# Patient Record
Sex: FEMALE | Race: WHITE | Employment: OTHER | URBAN - NONMETROPOLITAN AREA
[De-identification: names, ages, dates, MRNs, and addresses within clinical notes are randomized per-mention and may not be internally consistent; named-entity substitution may affect disease eponyms.]

---

## 2021-05-28 ENCOUNTER — APPOINTMENT (OUTPATIENT)
Dept: GENERAL RADIOLOGY | Age: 86
End: 2021-05-28
Payer: MEDICARE

## 2021-05-28 ENCOUNTER — APPOINTMENT (OUTPATIENT)
Dept: CT IMAGING | Age: 86
End: 2021-05-28
Payer: MEDICARE

## 2021-05-28 ENCOUNTER — HOSPITAL ENCOUNTER (OUTPATIENT)
Age: 86
Setting detail: OBSERVATION
Discharge: NURSING, REHAB OR LONG-TERM CARE | End: 2021-05-29
Attending: EMERGENCY MEDICINE | Admitting: INTERNAL MEDICINE
Payer: MEDICARE

## 2021-05-28 DIAGNOSIS — R26.9 GAIT DISTURBANCE: ICD-10-CM

## 2021-05-28 DIAGNOSIS — N39.0 URINARY TRACT INFECTION WITHOUT HEMATURIA, SITE UNSPECIFIED: ICD-10-CM

## 2021-05-28 DIAGNOSIS — R53.1 GENERAL WEAKNESS: ICD-10-CM

## 2021-05-28 DIAGNOSIS — R07.9 CHEST PAIN, UNSPECIFIED TYPE: Primary | ICD-10-CM

## 2021-05-28 LAB
ALBUMIN SERPL-MCNC: 3.9 G/DL (ref 3.5–5.2)
ALP BLD-CCNC: 115 U/L (ref 35–104)
ALT SERPL-CCNC: 19 U/L (ref 5–33)
ANION GAP SERPL CALCULATED.3IONS-SCNC: 10 MMOL/L (ref 7–19)
AST SERPL-CCNC: 24 U/L (ref 5–32)
BACTERIA: ABNORMAL /HPF
BASOPHILS ABSOLUTE: 0.1 K/UL (ref 0–0.2)
BASOPHILS RELATIVE PERCENT: 1 % (ref 0–1)
BILIRUB SERPL-MCNC: 0.3 MG/DL (ref 0.2–1.2)
BILIRUBIN URINE: NEGATIVE
BLOOD, URINE: ABNORMAL
BUN BLDV-MCNC: 26 MG/DL (ref 8–23)
CALCIUM SERPL-MCNC: 9.2 MG/DL (ref 8.2–9.6)
CHLORIDE BLD-SCNC: 105 MMOL/L (ref 98–111)
CLARITY: ABNORMAL
CO2: 24 MMOL/L (ref 22–29)
COLOR: YELLOW
CREAT SERPL-MCNC: 1.2 MG/DL (ref 0.5–0.9)
CRYSTALS, UA: ABNORMAL /HPF
EOSINOPHILS ABSOLUTE: 0.1 K/UL (ref 0–0.6)
EOSINOPHILS RELATIVE PERCENT: 1.8 % (ref 0–5)
EPITHELIAL CELLS, UA: 0 /HPF (ref 0–5)
GFR AFRICAN AMERICAN: 51
GFR NON-AFRICAN AMERICAN: 42
GLUCOSE BLD-MCNC: 113 MG/DL (ref 70–99)
GLUCOSE BLD-MCNC: 153 MG/DL (ref 70–99)
GLUCOSE BLD-MCNC: 156 MG/DL (ref 74–109)
GLUCOSE URINE: NEGATIVE MG/DL
HCT VFR BLD CALC: 42.3 % (ref 37–47)
HEMOGLOBIN: 14.7 G/DL (ref 12–16)
HYALINE CASTS: 0 /HPF (ref 0–8)
IMMATURE GRANULOCYTES #: 0 K/UL
INR BLD: 1.11 (ref 0.88–1.18)
KETONES, URINE: NEGATIVE MG/DL
LEUKOCYTE ESTERASE, URINE: ABNORMAL
LIPASE: 73 U/L (ref 13–60)
LV EF: 58 %
LVEF MODALITY: NORMAL
LYMPHOCYTES ABSOLUTE: 2.6 K/UL (ref 1.1–4.5)
LYMPHOCYTES RELATIVE PERCENT: 36.2 % (ref 20–40)
MCH RBC QN AUTO: 32.5 PG (ref 27–31)
MCHC RBC AUTO-ENTMCNC: 34.8 G/DL (ref 33–37)
MCV RBC AUTO: 93.6 FL (ref 81–99)
MONOCYTES ABSOLUTE: 0.6 K/UL (ref 0–0.9)
MONOCYTES RELATIVE PERCENT: 8 % (ref 0–10)
NEUTROPHILS ABSOLUTE: 3.8 K/UL (ref 1.5–7.5)
NEUTROPHILS RELATIVE PERCENT: 52.6 % (ref 50–65)
NITRITE, URINE: POSITIVE
PDW BLD-RTO: 13 % (ref 11.5–14.5)
PERFORMED ON: ABNORMAL
PERFORMED ON: ABNORMAL
PH UA: 6.5 (ref 5–8)
PHENYTOIN LEVEL: 5.1 UG/ML (ref 10–20)
PLATELET # BLD: 241 K/UL (ref 130–400)
PMV BLD AUTO: 9.7 FL (ref 9.4–12.3)
POTASSIUM SERPL-SCNC: 4.3 MMOL/L (ref 3.5–5)
PRO-BNP: 1209 PG/ML (ref 0–1800)
PROTEIN UA: ABNORMAL MG/DL
PROTHROMBIN TIME: 14.2 SEC (ref 12–14.6)
RBC # BLD: 4.52 M/UL (ref 4.2–5.4)
RBC UA: 5 /HPF (ref 0–4)
SODIUM BLD-SCNC: 139 MMOL/L (ref 136–145)
SPECIFIC GRAVITY UA: 1.01 (ref 1–1.03)
TOTAL PROTEIN: 7.8 G/DL (ref 6.6–8.7)
TROPONIN: <0.01 NG/ML (ref 0–0.03)
TROPONIN: <0.01 NG/ML (ref 0–0.03)
UROBILINOGEN, URINE: 0.2 E.U./DL
WBC # BLD: 7.2 K/UL (ref 4.8–10.8)
WBC UA: 349 /HPF (ref 0–5)

## 2021-05-28 PROCEDURE — 2580000003 HC RX 258: Performed by: INTERNAL MEDICINE

## 2021-05-28 PROCEDURE — 80185 ASSAY OF PHENYTOIN TOTAL: CPT

## 2021-05-28 PROCEDURE — 71045 X-RAY EXAM CHEST 1 VIEW: CPT

## 2021-05-28 PROCEDURE — 70450 CT HEAD/BRAIN W/O DYE: CPT

## 2021-05-28 PROCEDURE — 6360000002 HC RX W HCPCS: Performed by: INTERNAL MEDICINE

## 2021-05-28 PROCEDURE — P9612 CATHETERIZE FOR URINE SPEC: HCPCS

## 2021-05-28 PROCEDURE — 96374 THER/PROPH/DIAG INJ IV PUSH: CPT

## 2021-05-28 PROCEDURE — G0378 HOSPITAL OBSERVATION PER HR: HCPCS

## 2021-05-28 PROCEDURE — 6370000000 HC RX 637 (ALT 250 FOR IP): Performed by: INTERNAL MEDICINE

## 2021-05-28 PROCEDURE — 6360000002 HC RX W HCPCS: Performed by: EMERGENCY MEDICINE

## 2021-05-28 PROCEDURE — 73030 X-RAY EXAM OF SHOULDER: CPT

## 2021-05-28 PROCEDURE — 85025 COMPLETE CBC W/AUTO DIFF WBC: CPT

## 2021-05-28 PROCEDURE — 93005 ELECTROCARDIOGRAM TRACING: CPT

## 2021-05-28 PROCEDURE — 80053 COMPREHEN METABOLIC PANEL: CPT

## 2021-05-28 PROCEDURE — 83690 ASSAY OF LIPASE: CPT

## 2021-05-28 PROCEDURE — 83880 ASSAY OF NATRIURETIC PEPTIDE: CPT

## 2021-05-28 PROCEDURE — 93306 TTE W/DOPPLER COMPLETE: CPT

## 2021-05-28 PROCEDURE — 99284 EMERGENCY DEPT VISIT MOD MDM: CPT

## 2021-05-28 PROCEDURE — 81001 URINALYSIS AUTO W/SCOPE: CPT

## 2021-05-28 PROCEDURE — 2580000003 HC RX 258: Performed by: EMERGENCY MEDICINE

## 2021-05-28 PROCEDURE — 84484 ASSAY OF TROPONIN QUANT: CPT

## 2021-05-28 PROCEDURE — 85610 PROTHROMBIN TIME: CPT

## 2021-05-28 PROCEDURE — 36415 COLL VENOUS BLD VENIPUNCTURE: CPT

## 2021-05-28 PROCEDURE — 87186 SC STD MICRODIL/AGAR DIL: CPT

## 2021-05-28 PROCEDURE — 87086 URINE CULTURE/COLONY COUNT: CPT

## 2021-05-28 PROCEDURE — 82947 ASSAY GLUCOSE BLOOD QUANT: CPT

## 2021-05-28 RX ORDER — RISPERIDONE 0.25 MG/1
0.5 TABLET, FILM COATED ORAL 2 TIMES DAILY
Status: DISCONTINUED | OUTPATIENT
Start: 2021-05-28 | End: 2021-05-29 | Stop reason: HOSPADM

## 2021-05-28 RX ORDER — MIRTAZAPINE 15 MG/1
30 TABLET, FILM COATED ORAL NIGHTLY
Status: DISCONTINUED | OUTPATIENT
Start: 2021-05-28 | End: 2021-05-29 | Stop reason: HOSPADM

## 2021-05-28 RX ORDER — ONDANSETRON 2 MG/ML
4 INJECTION INTRAMUSCULAR; INTRAVENOUS EVERY 6 HOURS PRN
Status: DISCONTINUED | OUTPATIENT
Start: 2021-05-28 | End: 2021-05-29 | Stop reason: HOSPADM

## 2021-05-28 RX ORDER — POLYETHYLENE GLYCOL 3350 17 G/17G
17 POWDER, FOR SOLUTION ORAL DAILY PRN
Status: DISCONTINUED | OUTPATIENT
Start: 2021-05-28 | End: 2021-05-29 | Stop reason: HOSPADM

## 2021-05-28 RX ORDER — LEVOTHYROXINE SODIUM 0.03 MG/1
25 TABLET ORAL DAILY
COMMUNITY

## 2021-05-28 RX ORDER — NICOTINE POLACRILEX 4 MG
15 LOZENGE BUCCAL PRN
Status: DISCONTINUED | OUTPATIENT
Start: 2021-05-28 | End: 2021-05-29 | Stop reason: HOSPADM

## 2021-05-28 RX ORDER — DICLOFENAC EPOLAMINE 0.01 G/1
1 SYSTEM TOPICAL 2 TIMES DAILY
COMMUNITY

## 2021-05-28 RX ORDER — PRAVASTATIN SODIUM 40 MG
40 TABLET ORAL DAILY
COMMUNITY

## 2021-05-28 RX ORDER — ATENOLOL 50 MG/1
25 TABLET ORAL DAILY
Status: DISCONTINUED | OUTPATIENT
Start: 2021-05-29 | End: 2021-05-29 | Stop reason: HOSPADM

## 2021-05-28 RX ORDER — RISPERIDONE 0.5 MG/1
0.5 TABLET, FILM COATED ORAL 2 TIMES DAILY
COMMUNITY

## 2021-05-28 RX ORDER — PANTOPRAZOLE SODIUM 40 MG/1
40 TABLET, DELAYED RELEASE ORAL DAILY
Status: DISCONTINUED | OUTPATIENT
Start: 2021-05-28 | End: 2021-05-29 | Stop reason: HOSPADM

## 2021-05-28 RX ORDER — LEVOTHYROXINE SODIUM 0.05 MG/1
25 TABLET ORAL DAILY
Status: DISCONTINUED | OUTPATIENT
Start: 2021-05-28 | End: 2021-05-29 | Stop reason: HOSPADM

## 2021-05-28 RX ORDER — PHENYTOIN SODIUM 100 MG/1
100 CAPSULE, EXTENDED RELEASE ORAL 2 TIMES DAILY
Status: DISCONTINUED | OUTPATIENT
Start: 2021-05-28 | End: 2021-05-29 | Stop reason: HOSPADM

## 2021-05-28 RX ORDER — ACETAMINOPHEN 325 MG/1
650 TABLET ORAL EVERY 6 HOURS PRN
Status: DISCONTINUED | OUTPATIENT
Start: 2021-05-28 | End: 2021-05-29 | Stop reason: HOSPADM

## 2021-05-28 RX ORDER — POTASSIUM CHLORIDE 7.45 MG/ML
10 INJECTION INTRAVENOUS PRN
Status: DISCONTINUED | OUTPATIENT
Start: 2021-05-28 | End: 2021-05-29 | Stop reason: HOSPADM

## 2021-05-28 RX ORDER — NITROGLYCERIN 0.4 MG/1
0.4 TABLET SUBLINGUAL EVERY 5 MIN PRN
Status: DISCONTINUED | OUTPATIENT
Start: 2021-05-28 | End: 2021-05-29 | Stop reason: HOSPADM

## 2021-05-28 RX ORDER — SODIUM CHLORIDE 9 MG/ML
25 INJECTION, SOLUTION INTRAVENOUS PRN
Status: DISCONTINUED | OUTPATIENT
Start: 2021-05-28 | End: 2021-05-29 | Stop reason: HOSPADM

## 2021-05-28 RX ORDER — DEXTROSE MONOHYDRATE 25 G/50ML
12.5 INJECTION, SOLUTION INTRAVENOUS PRN
Status: DISCONTINUED | OUTPATIENT
Start: 2021-05-28 | End: 2021-05-29 | Stop reason: HOSPADM

## 2021-05-28 RX ORDER — ASPIRIN 81 MG/1
81 TABLET, CHEWABLE ORAL DAILY
COMMUNITY

## 2021-05-28 RX ORDER — CLOPIDOGREL BISULFATE 75 MG/1
75 TABLET ORAL DAILY
Status: DISCONTINUED | OUTPATIENT
Start: 2021-05-28 | End: 2021-05-29 | Stop reason: HOSPADM

## 2021-05-28 RX ORDER — CLOPIDOGREL BISULFATE 75 MG/1
75 TABLET ORAL DAILY
COMMUNITY

## 2021-05-28 RX ORDER — HEPARIN SODIUM 5000 [USP'U]/ML
5000 INJECTION, SOLUTION INTRAVENOUS; SUBCUTANEOUS EVERY 8 HOURS SCHEDULED
Status: DISCONTINUED | OUTPATIENT
Start: 2021-05-28 | End: 2021-05-29 | Stop reason: HOSPADM

## 2021-05-28 RX ORDER — ERGOCALCIFEROL 1.25 MG/1
50000 CAPSULE ORAL WEEKLY
Status: DISCONTINUED | OUTPATIENT
Start: 2021-05-28 | End: 2021-05-29 | Stop reason: HOSPADM

## 2021-05-28 RX ORDER — SODIUM CHLORIDE 0.9 % (FLUSH) 0.9 %
5-40 SYRINGE (ML) INJECTION EVERY 12 HOURS SCHEDULED
Status: DISCONTINUED | OUTPATIENT
Start: 2021-05-28 | End: 2021-05-29 | Stop reason: HOSPADM

## 2021-05-28 RX ORDER — OXYBUTYNIN CHLORIDE 5 MG/1
5 TABLET ORAL 2 TIMES DAILY
COMMUNITY

## 2021-05-28 RX ORDER — ASPIRIN 81 MG/1
81 TABLET, CHEWABLE ORAL DAILY
Status: DISCONTINUED | OUTPATIENT
Start: 2021-05-29 | End: 2021-05-29 | Stop reason: HOSPADM

## 2021-05-28 RX ORDER — PROMETHAZINE HYDROCHLORIDE 12.5 MG/1
12.5 TABLET ORAL EVERY 6 HOURS PRN
Status: DISCONTINUED | OUTPATIENT
Start: 2021-05-28 | End: 2021-05-29 | Stop reason: HOSPADM

## 2021-05-28 RX ORDER — NITROGLYCERIN 0.4 MG/1
0.4 TABLET SUBLINGUAL EVERY 5 MIN PRN
COMMUNITY

## 2021-05-28 RX ORDER — PANTOPRAZOLE SODIUM 40 MG/1
40 TABLET, DELAYED RELEASE ORAL DAILY
COMMUNITY

## 2021-05-28 RX ORDER — DEXTROSE MONOHYDRATE 50 MG/ML
100 INJECTION, SOLUTION INTRAVENOUS PRN
Status: DISCONTINUED | OUTPATIENT
Start: 2021-05-28 | End: 2021-05-29 | Stop reason: HOSPADM

## 2021-05-28 RX ORDER — PHENYTOIN SODIUM 100 MG/1
CAPSULE, EXTENDED RELEASE ORAL 2 TIMES DAILY
COMMUNITY

## 2021-05-28 RX ORDER — SODIUM CHLORIDE 9 MG/ML
INJECTION, SOLUTION INTRAVENOUS CONTINUOUS
Status: ACTIVE | OUTPATIENT
Start: 2021-05-28 | End: 2021-05-29

## 2021-05-28 RX ORDER — OXYBUTYNIN CHLORIDE 5 MG/1
5 TABLET ORAL 2 TIMES DAILY
Status: DISCONTINUED | OUTPATIENT
Start: 2021-05-28 | End: 2021-05-29 | Stop reason: HOSPADM

## 2021-05-28 RX ORDER — POTASSIUM CHLORIDE 20 MEQ/1
40 TABLET, EXTENDED RELEASE ORAL PRN
Status: DISCONTINUED | OUTPATIENT
Start: 2021-05-28 | End: 2021-05-29 | Stop reason: HOSPADM

## 2021-05-28 RX ORDER — ERGOCALCIFEROL (VITAMIN D2) 1250 MCG
50000 CAPSULE ORAL WEEKLY
COMMUNITY

## 2021-05-28 RX ORDER — ONDANSETRON HYDROCHLORIDE 8 MG/1
8 TABLET, FILM COATED ORAL EVERY 8 HOURS PRN
COMMUNITY

## 2021-05-28 RX ORDER — RISPERIDONE 0.25 MG/1
0.25 TABLET, FILM COATED ORAL EVERY OTHER DAY
COMMUNITY

## 2021-05-28 RX ORDER — L. ACIDOPHILUS/L.BULGARICUS 100MM CELL
GRANULES IN PACKET (EA) ORAL
COMMUNITY

## 2021-05-28 RX ORDER — ATENOLOL 25 MG/1
25 TABLET ORAL DAILY
COMMUNITY

## 2021-05-28 RX ORDER — ACETAMINOPHEN 650 MG/1
650 SUPPOSITORY RECTAL EVERY 6 HOURS PRN
Status: DISCONTINUED | OUTPATIENT
Start: 2021-05-28 | End: 2021-05-29 | Stop reason: HOSPADM

## 2021-05-28 RX ORDER — SODIUM CHLORIDE 0.9 % (FLUSH) 0.9 %
5-40 SYRINGE (ML) INJECTION PRN
Status: DISCONTINUED | OUTPATIENT
Start: 2021-05-28 | End: 2021-05-29 | Stop reason: HOSPADM

## 2021-05-28 RX ORDER — MIRTAZAPINE 30 MG/1
30 TABLET, FILM COATED ORAL NIGHTLY
COMMUNITY

## 2021-05-28 RX ORDER — PRAVASTATIN SODIUM 20 MG
40 TABLET ORAL DAILY
Status: DISCONTINUED | OUTPATIENT
Start: 2021-05-28 | End: 2021-05-29 | Stop reason: HOSPADM

## 2021-05-28 RX ORDER — MAGNESIUM SULFATE IN WATER 40 MG/ML
2000 INJECTION, SOLUTION INTRAVENOUS PRN
Status: DISCONTINUED | OUTPATIENT
Start: 2021-05-28 | End: 2021-05-29 | Stop reason: HOSPADM

## 2021-05-28 RX ORDER — ACETAMINOPHEN 325 MG/1
650 TABLET ORAL EVERY 6 HOURS PRN
COMMUNITY

## 2021-05-28 RX ADMIN — RISPERIDONE 0.5 MG: 0.25 TABLET ORAL at 21:58

## 2021-05-28 RX ADMIN — HEPARIN SODIUM 5000 UNITS: 5000 INJECTION INTRAVENOUS; SUBCUTANEOUS at 21:54

## 2021-05-28 RX ADMIN — MIRTAZAPINE 30 MG: 15 TABLET, FILM COATED ORAL at 21:54

## 2021-05-28 RX ADMIN — ACETAMINOPHEN 650 MG: 325 TABLET ORAL at 17:44

## 2021-05-28 RX ADMIN — SODIUM CHLORIDE: 9 INJECTION, SOLUTION INTRAVENOUS at 17:39

## 2021-05-28 RX ADMIN — OXYBUTYNIN CHLORIDE 5 MG: 5 TABLET ORAL at 21:54

## 2021-05-28 RX ADMIN — PHENYTOIN SODIUM 100 MG: 100 CAPSULE ORAL at 21:54

## 2021-05-28 RX ADMIN — CEFTRIAXONE 1000 MG: 1 INJECTION, POWDER, FOR SOLUTION INTRAMUSCULAR; INTRAVENOUS at 12:27

## 2021-05-28 RX ADMIN — HEPARIN SODIUM 5000 UNITS: 5000 INJECTION INTRAVENOUS; SUBCUTANEOUS at 17:39

## 2021-05-28 SDOH — HEALTH STABILITY: MENTAL HEALTH: HOW OFTEN DO YOU HAVE A DRINK CONTAINING ALCOHOL?: NEVER

## 2021-05-28 ASSESSMENT — ENCOUNTER SYMPTOMS
VOMITING: 0
BACK PAIN: 0

## 2021-05-28 ASSESSMENT — PAIN SCALES - GENERAL: PAINLEVEL_OUTOF10: 4

## 2021-05-28 NOTE — PROGRESS NOTES
Kaelyn Soto arrived to room # 726. Presented with: Chest pain  Mental Status: Patient is disoriented, alert, coherent, logical and thought processes intact. Vitals:    05/28/21 1424   BP: (!) 155/66   Pulse: 64   Resp: 20   Temp: 96.5 °F (35.8 °C)   SpO2: 97%     Patient safety contract and falls prevention contract reviewed with patient No.  Oriented Patient to room. Call light within reach. Yes. Needs, issues or concerns expressed at this time: yes, incontinence.       Electronically signed by Dolly Michelle RN on 5/28/2021 at 3:28 PM

## 2021-05-28 NOTE — PROGRESS NOTES
[address] is being assessed upon: Admission    I agree that Hannah Cooper, RN, along with Yoselin Hauser (either 2 RN's or 1 LPN and 1 RN) have performed a thorough Head to Toe Skin Assessment on the patient. ALL assessment sites listed below have been assessed. Areas assessed by both nurses:     [x]   Head, Face, and Ears   [x]   Shoulders, Back, and Chest  [x]   Arms, Elbows, and Hands   [x]   Coccyx, Sacrum, and Ischium  [x]   Legs, Feet, and Heels    Does the Patient have Skin Breakdown?  No    Parish Prevention initiated: Yes  Wound Care Orders initiated: No    [facility] nurse consulted for Pressure Injury (Stage 3,4, Unstageable, DTI, NWPT, and Complex wounds) and New or Established Ostomies: No        Primary Nurse eSignature: Aurea Streeter RN on 5/28/2021 at 3:27 PM      Co-Signer eSignature: {Esignature:[id number]}

## 2021-05-28 NOTE — ED PROVIDER NOTES
[facility] CARE  eMERGENCY dEPARTMENT eNCOUnter      Pt Name: Josefina Burroughs  MRN: [id number]  [place] 6/8/1929  Date of evaluation: 5/28/2021  Provider: Marina Evans MD    CHIEF COMPLAINT       Chief Complaint   Patient presents with    Chest Pain     Started at [facility] 10 per nursing home staff. Hx of MI         HISTORY OF PRESENT ILLNESS   (Location/Symptom, Timing/Onset,Context/Setting, Quality, Duration, Modifying Factors, Severity)  Note limiting factors. Josefina Burroughs is a 80 y.o. female who presents to the emergency department with cp off and on all week. Comes from [facility]. Has trouble with both legs R>L and can't hardly walk since is stiff. R hand is tingling,   \"blood not circulating in it. \"    Worse this AM has been going on \"quite a while, last year anyhow. \"    R side feels tingling. Pt is hard of hearing and exaperated trying to talk to me. Hard to get history. Couldn't walk since R leg tingling. Cp as well better now. \"felt like stroke to me, haven't been well in a while. \"   Both legs and circulation not good all over. Has had covid vaccine. Chart states she has dementia. The history is provided by the patient and the EMS personnel. NursingNotes were reviewed. REVIEW OF SYSTEMS    (2-9 systems for level 4, 10 or more for level 5)     Review of Systems   Unable to perform ROS: Dementia   Constitutional: Negative for fever. Cardiovascular: Positive for chest pain. Gastrointestinal: Negative for vomiting. Genitourinary: Positive for dysuria. Musculoskeletal: Negative for back pain. Neurological: Positive for weakness. Psychiatric/Behavioral: Positive for agitation. A complete review of systems was performed and is negative except as noted above in the HPI.        PAST MEDICAL HISTORY     Past Medical History:   Diagnosis Date    Anxiety     Cognitive communication deficit     [facility] [address]     Homicidal ideations     Hyperlipidemia     Hypertension     Insomnia     Seizures ([facility] [address]     Suicidal ideations     Thyroid disease          SURGICAL HISTORY     No past surgical history on file. CURRENT MEDICATIONS       Current Discharge Medication List      CONTINUE these medications which have NOT CHANGED    Details   aspirin 81 MG chewable tablet Take 81 mg by mouth daily      atenolol (TENORMIN) 25 MG tablet Take 25 mg by mouth daily      clopidogrel (PLAVIX) 75 MG tablet Take 75 mg by mouth daily      Cyanocobalamin (VITAMIN B 12) 100 MCG LOZG Take 1,000 mcg by mouth      diclofenac (FLECTOR) 1.3 % PTCH patch Place 1 patch onto the skin 2 times daily      Lactobacillus PACK Take by mouth      levothyroxine (SYNTHROID) 25 MCG tablet Take 25 mcg by mouth Daily      mirtazapine (REMERON) 30 MG tablet Take 30 mg by mouth nightly      nitroGLYCERIN (NITROSTAT) 0.4 MG SL tablet Place 0.4 mg under the tongue every 5 minutes as needed for Chest pain up to max of 3 total doses. If no relief after 1 dose, call 911.      nitroGLYCERIN (NITRODUR) patch Place 0.4 mg onto the skin every 24 hours      ondansetron (ZOFRAN) 8 MG tablet Take 8 mg by mouth every 8 hours as needed for Nausea or Vomiting      oxybutynin (DITROPAN) 5 MG tablet Take 5 mg by mouth 2 times daily      pantoprazole (PROTONIX) 40 MG tablet Take 40 mg by mouth daily      phenytoin (DILANTIN) 100 MG ER capsule Take by mouth 2 times daily      pravastatin (PRAVACHOL) 40 MG tablet Take 40 mg by mouth daily      acetaminophen (TYLENOL) 325 MG tablet Take 650 mg by mouth every 6 hours as needed for Pain      VANCOMYCIN HCL PO Take 1 g by mouth      ergocalciferol (ERGOCALCIFEROL) 1.25 MG (03159 UT) capsule Take 50,000 Units by mouth once a week      !! risperiDONE (RISPERDAL) 0.5 MG tablet Take 0.5 mg by mouth 2 times daily      !! risperiDONE (RISPERDAL) 0.25 MG tablet Take 0.25 mg by mouth every other day       !! - Potential duplicate medications found.  Please discuss with provider. ALLERGIES     Patient has no known allergies. FAMILY HISTORY     No family history on file. SOCIAL HISTORY       Social History     Socioeconomic History    Marital status:      Spouse name: Not on file    Number of children: Not on file    Years of education: Not on file    Highest education level: Not on file   Occupational History    Not on file   Tobacco Use    Smoking status: Unknown If Ever Smoked   Substance and Sexual Activity    Alcohol use: Never    Drug use: Never    Sexual activity: Not on file   Other Topics Concern    Not on file   Social History Narrative    Not on file     Social Determinants of Health     Financial Resource Strain:     Difficulty of Paying Living Expenses:    Food Insecurity:     Worried About Running Out of Food in the Last Year:     [address] in the Last Year:    Transportation Needs:     Lack of Transportation (Medical):  Lack of Transportation (Non-Medical):    Physical Activity:     Days of Exercise per Week:     Minutes of Exercise per Session:    Stress:     Feeling of Stress :    Social Connections:     Frequency of Communication with Friends and Family:     Frequency of Social Gatherings with Friends and Family:     Attends Synagogue Services:     Active Member of Clubs or Organizations:     Attends Club or Organization Meetings:     Marital Status:    Intimate Partner Violence:     Fear of Current or Ex-Partner:     Emotionally Abused:     Physically Abused:     Sexually Abused:        SCREENINGS             PHYSICAL EXAM    (up to 7 for level 4, 8 or more for level 5)     ED Triage Vitals [05/28/21 1025]   BP Temp Temp src Pulse Resp SpO2 Height Weight   105/75 98.1 °F (36.7 °C) -- 87 18 98 % 5' 2\" (1.575 m) 136 lb (61.7 kg)       Physical Exam  Vitals and nursing note reviewed. Constitutional:       Comments: Elderly laying in bed somewhat agitated at times. Multiple complaints.    HENT: Head: Normocephalic and atraumatic. Eyes:      Extraocular Movements: Extraocular movements intact. Pupils: Pupils are equal, round, and reactive to light. Cardiovascular:      Rate and Rhythm: Normal rate and regular rhythm. Comments: Her pulses are strong on both sides of her body for me  Pulmonary:      Effort: Pulmonary effort is normal. No respiratory distress. Abdominal:      General: Abdomen is flat. There is no distension. Palpations: Abdomen is soft. Musculoskeletal:         General: No tenderness. Normal range of motion. Cervical back: Normal range of motion and neck supple. Skin:     General: Skin is warm and dry. Neurological:      Mental Status: She is alert. Mental status is at baseline. Comments: She can tell me most things she seems a little off. Her GCS is technically 15 but I think she is probably little confused with dementia no focal deficits. Psychiatric:      Comments: Mildly agitated at times hard of hearing making talking her very difficult         DIAGNOSTIC RESULTS     EKG: All EKG's are interpreted by the Emergency Department Physician who either signs or Co-signs this chart in the absence of a cardiologist.    Sinus 79 no acute ischemia     RADIOLOGY:   Non-plain film images such as CT, Ultrasound and MRI are read by the radiologist. [facility] images are visualized and preliminarily interpreted by the emergency physician with the below findings:        Interpretation per the Radiologist below, if available at the time of this note:    CT HEAD WO CONTRAST   Final Result   Impression: No intracranial hemorrhage or acute findings. Diffuse   cerebral atrophy and advanced chronic small vessel white matter   ischemic changes, most pronounced in the frontal periventricular white   matter tracts. Noncontrast head CT does not exclude acute ischemia. If   this is a concern clinically, MRI of the brain would be appropriate   for follow-up.    Signed by Dr Maninder Durant. Camila on 5/28/2021 12:17 PM      XR CHEST PORTABLE   Final Result   A poor lung expansion but no active cardiopulmonary   disease. Signed by Dr Mandi Ramírez on 5/28/2021 12:12 PM      XR SHOULDER LEFT (MIN 2 VIEWS)    (Results Pending)   NM MYOCARDIAL SPECT REST EXERCISE OR RX    (Results Pending)         ED BEDSIDE ULTRASOUND:   Performed by ED Physician - none    LABS:  Labs Reviewed   COMPREHENSIVE METABOLIC PANEL - Abnormal; Notable for the following components:       Result Value    Glucose 156 (*)     BUN 26 (*)     CREATININE 1.2 (*)     GFR Non- 42 (*)     GFR African American 51 (*)     Alkaline Phosphatase 115 (*)     All other components within normal limits   CBC WITH AUTO DIFFERENTIAL - Abnormal; Notable for the following components:    MCH 32.5 (*)     All other components within normal limits   LIPASE - Abnormal; Notable for the following components:    Lipase 73 (*)     All other components within normal limits   URINE RT REFLEX TO CULTURE - Abnormal; Notable for the following components:    Clarity, UA CLOUDY (*)     Blood, Urine TRACE (*)     Protein, UA TRACE (*)     Nitrite, Urine POSITIVE (*)     Leukocyte Esterase, Urine LARGE (*)     All other components within normal limits   MICROSCOPIC URINALYSIS - Abnormal; Notable for the following components:    Bacteria, UA 4+ (*)     Crystals, UA NEG (*)     WBC,  (*)     RBC, UA 5 (*)     All other components within normal limits   PHENYTOIN LEVEL, TOTAL - Abnormal; Notable for the following components:    Phenytoin Lvl 5.1 (*)     All other components within normal limits   CULTURE, URINE   PROTIME-INR   TROPONIN   BRAIN NATRIURETIC PEPTIDE   TROPONIN   TROPONIN   TROPONIN       All other labs were within normal range or not returned as of this dictation.     EMERGENCY DEPARTMENT COURSE and DIFFERENTIALDIAGNOSIS/MDM:   Vitals:    Vitals:    05/28/21 1025 05/28/21 1212 05/28/21 1327 05/28/21 1424   BP: 105/75 90/66 132/62 (!) 155/66   Pulse: 87 82 80 64   Resp: 18 20 20 20   Temp: 98.1 °F (36.7 °C)   96.5 °F (35.8 °C)   TempSrc:    Temporal   SpO2: 98% 96% 95% 97%   Weight: 136 lb (61.7 kg)   147 lb (66.7 kg)   Height: 5' 2\" (1.575 m)          MDM  Number of Diagnoses or Management Options  Chest pain, unspecified type  Gait disturbance  General weakness  Urinary tract infection without hematuria, site unspecified  Diagnosis management comments: Patient with chest pain. Very hard to get history. EKG no acute changes troponins negative. Patient complaining of strokelike symptoms sort of vague. CT head was negative acute. Unclear due to her dementia the exact history. Patient with UTI treat. Patient with underlying gait disturbance having trouble getting up and walking. I did add a Dilantin level as I found this on her med list later. Her level was low. Discussed with hospitalist for admission to the hospitalist service. Continue work-up for generalized weakness chest pain and treat UTI. Amount and/or Complexity of Data Reviewed  Clinical lab tests: ordered and reviewed  Tests in the radiology section of CPT®: ordered and reviewed  Discuss the patient with other providers: yes  Independent visualization of images, tracings, or specimens: yes    Patient Progress  Patient progress: stable        CONSULTS:  None    PROCEDURES:  Unless otherwise notedbelow, none     Procedures    FINAL IMPRESSION     1. Chest pain, unspecified type    2. General weakness    3. Urinary tract infection without hematuria, site unspecified    4. Gait disturbance          DISPOSITION/PLAN   DISPOSITION Admitted 05/28/2021 01:26:05 PM      PATIENT REFERRED TO:  No follow-up provider specified.     DISCHARGE MEDICATIONS:  Current Discharge Medication List             (Please note that portions of this note were completed with a voice recognition program.  Efforts were made to edit the dictations butoccasionally words are mis-transcribed.)    Mike Dumas MD (electronically signed)  AttendingEmergency Physician         Mike Dumas MD  05/28/21 6659

## 2021-05-28 NOTE — ED TRIAGE NOTES
Pt has had 2 81mg of ASA prior to arrival to the ED. Pt refused ASA with EMS. Pt has a nitro patch on currently from nursing home.
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
## 2021-05-28 NOTE — PROGRESS NOTES
Pt's IV blocked and will not flush. Attempted a new iv in the R basilic vein; vein blew when attempted to flush. Pt tearful and anxious. Assisted to corona to go to xray.

## 2021-05-28 NOTE — ED NOTES
Attempted to ambulated patient from bed to [facility], pt unsteady on her feet, unable to transfer with assistance.       Destiny Steward RN  05/28/21 2661

## 2021-05-28 NOTE — H&P
[facility] - History & Physical    0726/726-02  PCP: Lorraine Chaparro MD  Date of Admission: 5/28/2021   Date of Service: Pt seen/examined on5/28/2021 and Placed in Observation. Chief Complaint:  Chest pain, neuropathy    History Of Present Illness: The patient is a 80 y.o. female with a past medical history of dementia, hypertension, CAD status post stents, hypothyroidism, hyperlipidemia who presented to the ED from nursing facility complaining of left-sided chest pain with radiation to the left shoulder. Patient is also complaining of numbness in her hands and feet which has been going on for at least a year. Patient is alert and oriented x3 (person, place, time) during my evaluation and states she usually has a rolling walker at home but does not walk that much at baseline. She has been having more difficulty ambulating recently. Patient is also complaining of dysuria. Patient otherwise denies dyspnea, nausea, vomiting, diarrhea, constipation, fevers, chills, sweats, cough, sick contacts, or recent travel. Past Medical History:        Diagnosis Date    Anxiety     Cognitive communication deficit     D[facility] ([facility] [address]     Homicidal ideations     Hyperlipidemia     Hypertension     Insomnia     Seizures ([facility] [address]     Suicidal ideations     Thyroid disease        Past Surgical History:    No past surgical history on file. Home Medications:  Prior to Admission medications    Medication Sig Start Date End Date Taking?  Authorizing Provider   aspirin 81 MG chewable tablet Take 81 mg by mouth daily   Yes Historical Provider, MD   atenolol (TENORMIN) 25 MG tablet Take 25 mg by mouth daily   Yes Historical Provider, MD   clopidogrel (PLAVIX) 75 MG tablet Take 75 mg by mouth daily   Yes Historical Provider, MD   Cyanocobalamin (VITAMIN B 12) 100 MCG LOZG Take 1,000 mcg by mouth   Yes Historical Provider, MD   diclofenac (FLECTOR) 1.3 % PTCH patch Place 1 patch onto the  Chest pain [R07.9] 05/28/2021    Dementia ([facility] [address] [F03.90]     Hyperlipidemia [E78.5]     Hypertension [I10]     Seizures ([facility] [address] [R56.9]     Thyroid disease [E07.9]     Anxiety [F41.9]     Difficulty walking [R26.2]     UTI (urinary tract infection) [N39.0]     CKD (chronic kidney disease), stage III [N18.30]        MPRESSION / PLAN:  Principal Problem:    Chest pain  Active Problems:    Dementia (HCC)    Hyperlipidemia    Hypertension    Seizures (HCC)    Thyroid disease    Anxiety    Difficulty walking    UTI (urinary tract infection)    CKD (chronic kidney disease), stage III  Resolved Problems:    * No resolved hospital problems. *    Chest pain: DAPT/Statin. Telemetry. Troponins. TTE/Stress test. PRN pain management. Left shoulder pain: XR.    UTI: Rocephin. Follow-up cultures. Difficulty ambulating: PT/OT. Seizure: Dilantin. HTN: Monitor and adjust meds PRN. Hypothyroidism: Synthroid. CKD III: Stable. Monitor BMP. Supportive management. DVT ppx: Heparin  Full code.     Juma Gee MD  5/28/2021

## 2021-05-29 VITALS
SYSTOLIC BLOOD PRESSURE: 155 MMHG | RESPIRATION RATE: 16 BRPM | OXYGEN SATURATION: 95 % | HEART RATE: 90 BPM | BODY MASS INDEX: 27.44 KG/M2 | DIASTOLIC BLOOD PRESSURE: 74 MMHG | WEIGHT: 149.13 LBS | HEIGHT: 62 IN | TEMPERATURE: 96.5 F

## 2021-05-29 LAB
ALBUMIN SERPL-MCNC: 3.7 G/DL (ref 3.5–5.2)
ALP BLD-CCNC: 92 U/L (ref 35–104)
ALT SERPL-CCNC: 17 U/L (ref 5–33)
ANION GAP SERPL CALCULATED.3IONS-SCNC: 9 MMOL/L (ref 7–19)
AST SERPL-CCNC: 20 U/L (ref 5–32)
BASOPHILS ABSOLUTE: 0.1 K/UL (ref 0–0.2)
BASOPHILS RELATIVE PERCENT: 1.1 % (ref 0–1)
BILIRUB SERPL-MCNC: 0.3 MG/DL (ref 0.2–1.2)
BUN BLDV-MCNC: 21 MG/DL (ref 8–23)
CALCIUM SERPL-MCNC: 9 MG/DL (ref 8.2–9.6)
CHLORIDE BLD-SCNC: 105 MMOL/L (ref 98–111)
CHOLESTEROL, TOTAL: 119 MG/DL (ref 160–199)
CO2: 25 MMOL/L (ref 22–29)
CREAT SERPL-MCNC: 1.1 MG/DL (ref 0.5–0.9)
EOSINOPHILS ABSOLUTE: 0.2 K/UL (ref 0–0.6)
EOSINOPHILS RELATIVE PERCENT: 2.7 % (ref 0–5)
GFR AFRICAN AMERICAN: 56
GFR NON-AFRICAN AMERICAN: 46
GLUCOSE BLD-MCNC: 114 MG/DL (ref 74–109)
GLUCOSE BLD-MCNC: 116 MG/DL (ref 70–99)
HBA1C MFR BLD: 5.5 % (ref 4–6)
HCT VFR BLD CALC: 40.2 % (ref 37–47)
HDLC SERPL-MCNC: 43 MG/DL (ref 65–121)
HEMOGLOBIN: 13.8 G/DL (ref 12–16)
IMMATURE GRANULOCYTES #: 0 K/UL
LDL CHOLESTEROL CALCULATED: 48 MG/DL
LYMPHOCYTES ABSOLUTE: 2.6 K/UL (ref 1.1–4.5)
LYMPHOCYTES RELATIVE PERCENT: 34.6 % (ref 20–40)
MAGNESIUM: 1.7 MG/DL (ref 1.7–2.3)
MCH RBC QN AUTO: 31.9 PG (ref 27–31)
MCHC RBC AUTO-ENTMCNC: 34.3 G/DL (ref 33–37)
MCV RBC AUTO: 92.8 FL (ref 81–99)
MONOCYTES ABSOLUTE: 0.7 K/UL (ref 0–0.9)
MONOCYTES RELATIVE PERCENT: 8.9 % (ref 0–10)
NEUTROPHILS ABSOLUTE: 3.9 K/UL (ref 1.5–7.5)
NEUTROPHILS RELATIVE PERCENT: 52.3 % (ref 50–65)
PDW BLD-RTO: 13 % (ref 11.5–14.5)
PERFORMED ON: ABNORMAL
PLATELET # BLD: 218 K/UL (ref 130–400)
PMV BLD AUTO: 9.7 FL (ref 9.4–12.3)
POTASSIUM SERPL-SCNC: 4.1 MMOL/L (ref 3.5–5)
RBC # BLD: 4.33 M/UL (ref 4.2–5.4)
SODIUM BLD-SCNC: 139 MMOL/L (ref 136–145)
TOTAL PROTEIN: 6.8 G/DL (ref 6.6–8.7)
TRIGL SERPL-MCNC: 140 MG/DL (ref 0–149)
TROPONIN: <0.01 NG/ML (ref 0–0.03)
WBC # BLD: 7.4 K/UL (ref 4.8–10.8)

## 2021-05-29 PROCEDURE — 80061 LIPID PANEL: CPT

## 2021-05-29 PROCEDURE — 80053 COMPREHEN METABOLIC PANEL: CPT

## 2021-05-29 PROCEDURE — G0378 HOSPITAL OBSERVATION PER HR: HCPCS

## 2021-05-29 PROCEDURE — 83036 HEMOGLOBIN GLYCOSYLATED A1C: CPT

## 2021-05-29 PROCEDURE — 85025 COMPLETE CBC W/AUTO DIFF WBC: CPT

## 2021-05-29 PROCEDURE — 84484 ASSAY OF TROPONIN QUANT: CPT

## 2021-05-29 PROCEDURE — 6370000000 HC RX 637 (ALT 250 FOR IP): Performed by: INTERNAL MEDICINE

## 2021-05-29 PROCEDURE — 83735 ASSAY OF MAGNESIUM: CPT

## 2021-05-29 PROCEDURE — 82947 ASSAY GLUCOSE BLOOD QUANT: CPT

## 2021-05-29 PROCEDURE — 36415 COLL VENOUS BLD VENIPUNCTURE: CPT

## 2021-05-29 RX ORDER — CEFDINIR 300 MG/1
300 CAPSULE ORAL 2 TIMES DAILY
Qty: 10 CAPSULE | Refills: 0 | Status: SHIPPED | OUTPATIENT
Start: 2021-05-29 | End: 2021-06-03

## 2021-05-29 RX ADMIN — PHENYTOIN SODIUM 100 MG: 100 CAPSULE ORAL at 08:56

## 2021-05-29 RX ADMIN — RISPERIDONE 0.5 MG: 0.25 TABLET ORAL at 08:55

## 2021-05-29 RX ADMIN — OXYBUTYNIN CHLORIDE 5 MG: 5 TABLET ORAL at 08:56

## 2021-05-29 RX ADMIN — PRAVASTATIN SODIUM 40 MG: 20 TABLET ORAL at 08:55

## 2021-05-29 RX ADMIN — ATENOLOL 25 MG: 50 TABLET ORAL at 08:55

## 2021-05-29 RX ADMIN — PANTOPRAZOLE SODIUM 40 MG: 40 TABLET, DELAYED RELEASE ORAL at 08:55

## 2021-05-29 RX ADMIN — ASPIRIN 81 MG: 81 TABLET, CHEWABLE ORAL at 08:55

## 2021-05-29 RX ADMIN — CLOPIDOGREL BISULFATE 75 MG: 75 TABLET ORAL at 08:55

## 2021-05-29 NOTE — DISCHARGE INSTR - COC
Continuity of Care Form    Patient Name: Tasneem Mena   :  1929  MRN:  [id number]    [facility] date:  2021  Discharge date:  2021    Code Status Order: Full Code   Advance Directives:   [facility] Documentation     Date/Time Healthcare Directive Type of Healthcare Directive Copy in [address] Agent's Name Healthcare Agent's Phone Number    21 0222  No, patient does not have an advance directive for healthcare treatment -- -- -- -- --          Admitting Physician:  Gail Jones MD  PCP: Kat Paez MD    Discharging Nurse: [facility] Unit/Room#: 0726/726-02  Discharging Unit Phone Number: [phone]      Emergency Contact:   Extended Emergency Contact Information  Primary Emergency Contact: [address] [place] [address] Phone: [phone]  Relation: Other    Past Surgical History:  History reviewed. No pertinent surgical history.     Immunization History:   Immunization History   Administered Date(s) Administered    COVID-19, Moderna, PF, 100mcg/0.5mL 2021, 2021       Active Problems:  Patient Active Problem List   Diagnosis Code    Chest pain R07.9    Dementia ([facility] [address] F03.90    Hyperlipidemia E78.5    Hypertension I10    Seizures ([facility] [address] R56.9    Thyroid disease E07.9    Anxiety F41.9    Difficulty walking R26.2    UTI (urinary tract infection) N39.0    CKD (chronic kidney disease), stage III N18.30       Isolation/Infection:   Isolation          No Isolation        Patient Infection Status     None to display          Nurse Assessment:  Last Vital Signs: BP (!) 155/74   Pulse 90   Temp 96.5 °F (35.8 °C) (Temporal)   Resp 16   Ht 5' 2\" (1.575 m)   Wt 149 lb 2 oz (67.6 kg)   SpO2 95%   BMI 27.28 kg/m²     Last documented pain score (0-10 scale): Pain Level: 4  Last Weight:   Wt Readings from Last 1 Encounters:   21 149 lb 2 oz (67.6 kg)     Mental Status:  oriented and alert    IV Access:  - None    Nursing Mobility/ADLs:  Walking   Assisted  Transfer  Assisted  Bathing  Assisted  Dressing  Assisted  Toileting  Assisted  Feeding  Independent  Med Admin  Assisted  Med Delivery   whole    Wound Care Documentation and Therapy:        Elimination:  Continence:   · Bowel: No  · Bladder: No  Urinary Catheter: None   Colostomy/Ileostomy/Ileal Conduit: No       Date of Last BM: 5/27/2021    Intake/Output Summary (Last 24 hours) at 5/29/2021 1011  Last data filed at 5/29/2021 0943  Gross per 24 hour   Intake 480 ml   Output 802 ml   Net -322 ml     I/O last 3 completed shifts: In: 360 [P.O.:360]  Out: 1 [Urine:1]    Safety Concerns:     None    Impairments/Disabilities:      Vision and Hearing    Nutrition Therapy:  Current Nutrition Therapy:   - Oral Diet:  Cardiac    Routes of Feeding: Oral  Liquids: Thin Liquids  Daily Fluid Restriction: no  Last Modified Barium Swallow with Video (Video Swallowing Test): not done    Treatments at the Time of Hospital Discharge:   Respiratory Treatments:   Oxygen Therapy:  is not on home oxygen therapy.   Ventilator:    - No ventilator support    Rehab Therapies: Physical Therapy, Occupational Therapy and Speech/Language Therapy  Weight Bearing Status/Restrictions: No weight bearing restirctions  Other Medical Equipment (for information only, NOT a DME order):  walker  Other Treatments:     Patient's personal belongings (please select all that are sent with patient):  Dentures partial upper plate    RN SIGNATURE:  Electronically signed by Lanny Mckenzie LPN on 3/02/10 at 94:29 AM CDT    CASE MANAGEMENT/SOCIAL WORK SECTION    Inpatient Status Date: ***    Readmission Risk Assessment Score:  Readmission Risk              Risk of Unplanned Readmission:  0           Discharging to Facility/ Agency   · Name:   · Address:  · Phone:  · Fax:    Dialysis Facility (if applicable)   · Name:  · Address:  · Dialysis Schedule:  · Phone:  · Fax:    / signature:

## 2021-05-29 NOTE — CARE COORDINATION
Cab voucher was made for pt transport back to [facility].  [facility] is unable to transport. $27.60

## 2021-05-29 NOTE — DISCHARGE SUMMARY
55-60%. No evidence of left ventricular mass or thrombus noted. Normal right atrial dimension with no evidence of thrombus or mass noted. Normal right ventricular size with preserved RV function. No evidence of significant pericardial effusion is noted. Signature   ----------------------------------------------------------------  Electronically signed by Chaitanya Moss MD(Interpreting  physician) on 05/28/2021 05:04 PM  ----------------------------------------------------------------   Findings   Mitral Valve  Structurally normal mitral valve with normal leaflet mobility. No evidence  of mitral valve stenosis or mild mitral regurgitation. Aortic Valve  Aortic valve appears to be tricuspid. Structurally normal aortic valve. No significant aortic regurgitation or stenosis is noted. Tricuspid Valve  Tricuspid valve is structurally normal.  No evidence of tricuspid regurgitation. Left Atrium  Normal size left atrium. Left Ventricle  Normal left ventricular size with preserved LV function and an estimated  ejection fraction of approximately 55-60%. No evidence of left ventricular mass or thrombus noted. Right Atrium  Normal right atrial dimension with no evidence of thrombus or mass noted. Right Ventricle  Normal right ventricular size with preserved RV function. Pericardial Effusion  No evidence of significant pericardial effusion is noted. M-Mode Measurements (cm)   LVIDd: 3.19 cm                         LVIDs: 2.19 cm  IVSd: 1.11 cm  LVPWd: 1.11 cm                         AO Root Dimension: 2.9 cm  % Ejection Fraction: 60.6 %            LA:  2.9 cm                                         LVOT: 2 cm  Doppler Measurements:   AV Peak Velocity:140 cm/s            MV Peak E-Wave: 69.8 cm/s  AV Peak Gradient: 7.84 mmHg          MV Peak A-Wave: 87 cm/s  AV Mean Gradient: 4 mmHg             MV E/A Ratio: 0.8 %  AV Area (Continuity):2.39 cm^2       MV Peak Gradient: 1.95 mmHg MV P1/2t: 62 msec  Estimated RAP:3 mmHg                 MVA by PHT3.55 cm^2      CT HEAD WO CONTRAST    Result Date: 5/28/2021  EXAMINATION: CT HEAD WO CONTRAST 5/28/2021 12:12 PM HISTORY: Right-sided numbness. DOSE: 803 mGycm. Automatic exposure control was utilized in an effort to use as little radiation as possible, without compromising image quality. REPORT: Axial CT of the head was performed without contrast, reconstructed sagittal and coronal images are also reviewed. COMPARISON:There are no correlative imaging studies for comparison. No intracranial hemorrhage, mass or mass effect is identified. There is mild/moderate diffuse atrophy. There is mild dilation of the ventricular system and basal cisterns, greatest in the frontal white matter and congruent with the degree of atrophy identified. There is moderate decreased attenuation in the periventricular and subcortical white matter tracts compatible chronic small vessel white matter ischemic disease. There lacunar-type ischemic changes within the basal ganglia, slightly greater on the right. Review of bone windows is unremarkable. Impression: No intracranial hemorrhage or acute findings. Diffuse cerebral atrophy and advanced chronic small vessel white matter ischemic changes, most pronounced in the frontal periventricular white matter tracts. Noncontrast head CT does not exclude acute ischemia. If this is a concern clinically, MRI of the brain would be appropriate for follow-up. Signed by Dr Ajay Krishnamurthy. Camila on 5/28/2021 12:17 PM    XR SHOULDER LEFT (MIN 2 VIEWS)    Result Date: 5/28/2021  EXAMINATION: Left shoulder 3 views 5/28/2021 HISTORY: Shoulder pain FINDINGS: Three-view exam of the left shoulder demonstrates no fracture or dislocation. There is spurring of the greater tuberosity of the humeral head commonly associated with chronic rotator cuff degeneration.  There is mild arthrosis of the [facility] joint with a laterally downsloping acromion    1.. No evidence of acute fracture or dislocation. 2. Arthritic changes of the shoulder as described above. Signed by Dr Thao Wells on 2021 8:10 PM    XR CHEST PORTABLE    Result Date: 2021  Examination. XR CHEST PORTABLE 2021 10:42 AM History: Chest pain. A single frontal portable semiupright moderately lordotic view of the chest is obtained. There is no previous study for comparison. The lungs are expanded. A few scattered granulomas and areas of scarring in both lungs are seen. There is no pleural effusion, pulmonary congestion or pneumothorax. Heart size is not optimally evaluated due to the portable projection. Atheromatous changes thoracic aorta are noted. Moderate diffuse osteopenia. No acute bony abnormality. A poor lung expansion but no active cardiopulmonary disease. Signed by Dr Bijal Hendrickson on 2021 12:12 PM      Pertinent Labs:   CBC:   Recent Labs     21  1030 21  0527   WBC 7.2 7.4   HGB 14.7 13.8    218     BMP:    Recent Labs     21  1030 21  0527    139   K 4.3 4.1    105   CO2 24 25   BUN 26* 21   CREATININE 1.2* 1.1*   GLUCOSE 156* 114*     INR:   Recent Labs     21  1030   INR 1.11     ABGs:No results for input(s): PH, PO2, PCO2, HCO3, BE, O2SAT in the last 72 hours. Lactic Acid:No results for input(s): LACTA in the last 72 hours. Procedures: None  Hospital Course: 80 y.o. female with a past medical history of dementia, hypertension, CAD status post stents, hypothyroidism, hyperlipidemia who presented to the ED from nursing facility complaining of left-sided chest pain with radiation to the left shoulder. Patient is also complaining of numbness in her hands and feet which has been going on for at least a year. Troponin negative x3. TTE grossly unremarkable. Attempted to perform stress test on the patient but she adamantly refused.   Patient states she is better and wants to go back to skilled nursing facility and she is currently hemodynamically stable to do so. Patient will be prescribed an empiric course of antibiotics for UTI. X-ray of the shoulder showing degenerative changes but no acute fracture or dislocation. Patient is hemodynamically stable to follow-up as an outpatient with her PCP.     Physical Exam:  Vitals: BP (!) 155/74   Pulse 90   Temp 96.5 °F (35.8 °C) (Temporal)   Resp 16   Ht 5' 2\" (1.575 m)   Wt 149 lb 2 oz (67.6 kg)   SpO2 95%   BMI 27.28 kg/m²   24HR INTAKE/OUTPUT:      Intake/Output Summary (Last 24 hours) at 5/29/2021 1008  Last data filed at 5/29/2021 8190  Gross per 24 hour   Intake 480 ml   Output 802 ml   Net -322 ml     General appearance: Alert  Head: NC/AT  Eyes: conjunctivae/corneas clear   Ears: normal external ears  Neck: supple  Lungs: BLAE, no wheezing or crackles appreciated   Heart: RRR, no murmurs appreciated   Abdomen: BS+, soft, NT, ND, no CVA tenderness  Extremities: no edema, pulses+, chronic b/l LE skin changes  Skin: warm  Neurologic: Alert, gross motor function intact, numbness of b/l hands/feet  Psychiatric:  Calm    Discharge Medications:       Baltazar Riding, [address] Medication Instructions [facility]:[id number]    Printed on:05/29/21 1008   Medication Information                      acetaminophen (TYLENOL) 325 MG tablet  Take 650 mg by mouth every 6 hours as needed for Pain             aspirin 81 MG chewable tablet  Take 81 mg by mouth daily             atenolol (TENORMIN) 25 MG tablet  Take 25 mg by mouth daily             cefdinir (OMNICEF) 300 MG capsule  Take 1 capsule by mouth 2 times daily for 5 days             clopidogrel (PLAVIX) 75 MG tablet  Take 75 mg by mouth daily             Cyanocobalamin (VITAMIN B 12) 100 MCG LOZG  Take 1,000 mcg by mouth             diclofenac (FLECTOR) 1.3 % PTCH patch  Place 1 patch onto the skin 2 times daily             ergocalciferol (ERGOCALCIFEROL) 1.25 MG (25167 UT) capsule  Take 50,000 Units by mouth once a week             Lactobacillus PACK  Take by mouth             levothyroxine (SYNTHROID) 25 MCG tablet  Take 25 mcg by mouth Daily             mirtazapine (REMERON) 30 MG tablet  Take 30 mg by mouth nightly             nitroGLYCERIN (NITRODUR) patch  Place 0.4 mg onto the skin every 24 hours             nitroGLYCERIN (NITROSTAT) 0.4 MG SL tablet  Place 0.4 mg under the tongue every 5 minutes as needed for Chest pain up to max of 3 total doses. If no relief after 1 dose, call 911. ondansetron (ZOFRAN) 8 MG tablet  Take 8 mg by mouth every 8 hours as needed for Nausea or Vomiting             oxybutynin (DITROPAN) 5 MG tablet  Take 5 mg by mouth 2 times daily             pantoprazole (PROTONIX) 40 MG tablet  Take 40 mg by mouth daily             phenytoin (DILANTIN) 100 MG ER capsule  Take by mouth 2 times daily             pravastatin (PRAVACHOL) 40 MG tablet  Take 40 mg by mouth daily             risperiDONE (RISPERDAL) 0.25 MG tablet  Take 0.25 mg by mouth every other day             risperiDONE (RISPERDAL) 0.5 MG tablet  Take 0.5 mg by mouth 2 times daily             VANCOMYCIN HCL PO  Take 1 g by mouth                 Discharge Instructions: Follow up with Corazon Sanchez MD in 7 days. Take medications as directed. Resume activity as tolerated. Diet: DIET CARDIAC; Disposition: Patient is medically stable and will be discharged Skilled nursing facility. Time spent on discharge 35 minutes.     Signed:  Rocio Ovalles MD 5/29/2021 10:08 AM

## 2021-05-29 NOTE — PROGRESS NOTES
Mrs. Terese Frost finally allowed me to draw some blood from her IV site, then proceeded to say I'm not taking any pills and \"I'm not taking any shots either\" Zulma Spaulding did too much to me already \"since I come down here, and I'm NOT HAVING ANYMORE TESTS EITHER\". Goes on to say \"I can refuse anything and I know it\".

## 2021-05-31 LAB
ORGANISM: ABNORMAL
ORGANISM: ABNORMAL
URINE CULTURE, ROUTINE: ABNORMAL

## 2021-06-01 LAB
EKG P AXIS: -18 DEGREES
EKG P AXIS: 53 DEGREES
EKG P-R INTERVAL: 128 MS
EKG P-R INTERVAL: 142 MS
EKG Q-T INTERVAL: 400 MS
EKG Q-T INTERVAL: 436 MS
EKG QRS DURATION: 80 MS
EKG QRS DURATION: 82 MS
EKG QTC CALCULATION (BAZETT): 431 MS
EKG QTC CALCULATION (BAZETT): 450 MS
EKG T AXIS: 38 DEGREES
EKG T AXIS: 65 DEGREES